# Patient Record
Sex: MALE | Race: WHITE
[De-identification: names, ages, dates, MRNs, and addresses within clinical notes are randomized per-mention and may not be internally consistent; named-entity substitution may affect disease eponyms.]

---

## 2018-06-04 ENCOUNTER — HOSPITAL ENCOUNTER (EMERGENCY)
Dept: HOSPITAL 31 - C.ER | Age: 23
Discharge: HOME | End: 2018-06-04
Payer: SELF-PAY

## 2018-06-04 VITALS
DIASTOLIC BLOOD PRESSURE: 69 MMHG | HEART RATE: 71 BPM | SYSTOLIC BLOOD PRESSURE: 123 MMHG | TEMPERATURE: 97.8 F | RESPIRATION RATE: 16 BRPM

## 2018-06-04 VITALS — OXYGEN SATURATION: 99 %

## 2018-06-04 DIAGNOSIS — J45.909: Primary | ICD-10-CM

## 2018-06-04 DIAGNOSIS — R07.81: ICD-10-CM

## 2018-06-04 NOTE — RAD
HISTORY:

Pain



COMPARISON:

No prior.



TECHNIQUE:

Chest PA and lateral



FINDINGS:



LUNGS:

No active pulmonary disease.



PLEURA:

No significant pleural effusion identified. No pneumothorax apparent.



CARDIOVASCULAR:

Normal.



OSSEOUS STRUCTURES:

No significant abnormalities.



VISUALIZED UPPER ABDOMEN:

Normal.



OTHER FINDINGS:

None.



IMPRESSION:

No active disease.

## 2018-06-04 NOTE — C.PDOC
History Of Present Illness


22 year old male presents to the ER with a complaint of SOB and intermittent 

chest wall pain that began today. Patient states the pain worsens with deep 

inspiration. As per mother, patient has a Hx of asthma, however, has not 

required a rescue pump over the past few years. Mother also states patient has 

recently been using hookah. Denies nausea, vomiting, or fever.


Time Seen by Provider: 18 14:04


Chief Complaint (Nursing): Chest Pain


History Per: Patient


History/Exam Limitations: no limitations


Onset/Duration Of Symptoms: Hrs, Intermittent Episodes


Current Symptoms Are (Timing): Still Present


Recent travel outside of the [place]: No





Past Medical History


Reviewed: Historical Data, Nursing Documentation, Vital Signs


Vital Signs: 


 Last Vital Signs











Temp  97.8 F   18 14:49


 


Pulse  71   18 14:49


 


Resp  16   18 14:49


 


BP  123/69   18 14:49


 


Pulse Ox  99   18 14:49














- Medical History


PMH: Asthma


Family History: States: Unknown Family Hx





- Social History


Hx Alcohol Use: No


Hx Substance Use: No





- Immunization History


Hx Tetanus Toxoid Vaccination: No


Hx Influenza Vaccination: No


Hx Pneumococcal Vaccination: No





Review Of Systems


Constitutional: Negative for: Fever, Chills


Respiratory: Positive for: Shortness of Breath


Gastrointestinal: Negative for: Nausea, Vomiting


Musculoskeletal: Positive for: Other (Chest wall pain)





Physical Exam





- Physical Exam


Appears: Non-toxic


Skin: Normal Color, Warm, Dry


Head: Atraumatic, Normacephalic


Eye(s): bilateral: Normal Inspection


Oral Mucosa: Moist


Chest: Symmetrical, No Tenderness


Cardiovascular: Rhythm Regular


Respiratory: Normal Breath Sounds, No Rales, No Rhonchi, No Wheezing


Gastrointestinal/Abdominal: Soft, No Tenderness


Neurological/Psych: Oriented x3, Normal Speech, Other (No focal deficits)





ED Course And Treatment


ECG: Interpreted By Me, Viewed By Me


ECG Rhythm: Sinus Rhythm


ECG Interpretation: Normal


Rate From EC


O2 Sat by Pulse Oximetry: 99 (Room air)


Pulse Ox Interpretation: Normal





- Other Rad


  ** CXR


X-Ray: Viewed By Me, Read By Radiologist


Interpretation: HISTORY:  Pain.  COMPARISON:  No prior.  TECHNIQUE:  Chest PA 

and lateral.  FINDINGS:  LUNGS:  No active pulmonary disease.  PLEURA:  No 

significant pleural effusion identified. No pneumothorax apparent.  

CARDIOVASCULAR:  Normal.  OSSEOUS STRUCTURES:  No significant abnormalities.  

VISUALIZED UPPER ABDOMEN:  Normal.  OTHER FINDINGS:  None.  IMPRESSION:  No 

active disease.





Medical Decision Making


Medical Decision Making: 





Plan:


* CXR


* EKG


* Albuterol nebulizer








CXR


IMPRESSION:


No active disease.





Patient remained afebrile and in no acute respiratory distress. On re-eval he 

reports feeling better after nebulizer treatment. Patient stable for discharge 

and given Rx ventolin and prednisone and advised on to smoke any substances





Disposition


Counseled Patient/Family Regarding: Diagnosis, Need For Followup, Rx Given, 

Smoking Cessation





- Disposition


Referrals: 


[facility] [Outside]


Disposition: HOME/ ROUTINE


Disposition Time: 14:42


Condition: GOOD


Additional Instructions: 


Vaya a el mdico ortopdico clnica en 1-3 flaherty sin falta, para mas evaluacin. 

[place] [facility]  betty indicado.





Prescriptions: 


Albuterol HFA [Ventolin HFA 90 mcg/actuation (8 g)] 1 puff IH Q4 #1 puff


predniSONE [predniSONE Tab] 40 mg PO DAILY #10 tab


Instructions:  Asthma, Adult (DC)


Print Language: Cymro





- POA


Present On Arrival: None





- Clinical Impression


Clinical Impression: 


 Pleuritic pain, Asthma








- PA / NP / Resident Statement


MD/DO has reviewed & agrees with the documentation as recorded.





- Scribe Statement


The provider has reviewed the documentation as recorded by the Scribkate Campuzano





All medical record entries made by the Scribe were at my direction and 

personally dictated by me. I have reviewed the chart and agree that the record 

accurately reflects my personal performance of the history, physical exam, 

medical decision making, and the department course for this patient. I have 

also personally directed, reviewed, and agree with the discharge instructions 

and disposition.

## 2018-06-05 NOTE — CARD
--------------- APPROVED REPORT --------------





EKG Measurement

Heart [id number]

[id number]

[id number]

[id number]

[id number]



<Conclusion>

Normal sinus rhythm

Minimal voltage criteria for LVH, may be normal variant

Borderline ECG